# Patient Record
Sex: MALE | Race: OTHER | Employment: FULL TIME | URBAN - METROPOLITAN AREA
[De-identification: names, ages, dates, MRNs, and addresses within clinical notes are randomized per-mention and may not be internally consistent; named-entity substitution may affect disease eponyms.]

---

## 2018-11-01 ENCOUNTER — HOSPITAL ENCOUNTER (OUTPATIENT)
Dept: LAB | Age: 18
Discharge: HOME | End: 2018-11-01

## 2018-11-01 ENCOUNTER — HOSPITAL ENCOUNTER (OUTPATIENT)
Dept: GENERAL RADIOLOGY | Age: 18
Discharge: HOME | End: 2018-11-01
Payer: SELF-PAY

## 2018-11-01 ENCOUNTER — OFFICE VISIT (OUTPATIENT)
Dept: FAMILY MEDICINE CLINIC | Age: 18
End: 2018-11-01

## 2018-11-01 VITALS
HEIGHT: 64 IN | SYSTOLIC BLOOD PRESSURE: 125 MMHG | HEART RATE: 65 BPM | DIASTOLIC BLOOD PRESSURE: 76 MMHG | TEMPERATURE: 98.6 F | WEIGHT: 112 LBS | BODY MASS INDEX: 19.12 KG/M2

## 2018-11-01 DIAGNOSIS — M54.50 CHRONIC BILATERAL LOW BACK PAIN WITHOUT SCIATICA: Primary | ICD-10-CM

## 2018-11-01 DIAGNOSIS — G89.29 CHRONIC BILATERAL LOW BACK PAIN WITHOUT SCIATICA: Primary | ICD-10-CM

## 2018-11-01 DIAGNOSIS — G89.29 CHRONIC BILATERAL LOW BACK PAIN WITHOUT SCIATICA: ICD-10-CM

## 2018-11-01 DIAGNOSIS — R10.32 LEFT LOWER QUADRANT PAIN: ICD-10-CM

## 2018-11-01 DIAGNOSIS — B35.9 TINEA: ICD-10-CM

## 2018-11-01 DIAGNOSIS — M54.50 CHRONIC BILATERAL LOW BACK PAIN WITHOUT SCIATICA: ICD-10-CM

## 2018-11-01 LAB
ALBUMIN SERPL-MCNC: 4.2 G/DL (ref 3.5–5)
ALBUMIN/GLOB SERPL: 1.3 {RATIO} (ref 1.1–2.2)
ALP SERPL-CCNC: 84 U/L (ref 45–117)
ALT SERPL-CCNC: 36 U/L (ref 12–78)
ANION GAP SERPL CALC-SCNC: 11 MMOL/L (ref 5–15)
AST SERPL-CCNC: 18 U/L (ref 15–37)
BASOPHILS # BLD: 0.1 K/UL (ref 0–0.1)
BASOPHILS NFR BLD: 1 % (ref 0–1)
BILIRUB SERPL-MCNC: 0.5 MG/DL (ref 0.2–1)
BUN SERPL-MCNC: 17 MG/DL (ref 6–20)
BUN/CREAT SERPL: 21 (ref 12–20)
CALCIUM SERPL-MCNC: 8.9 MG/DL (ref 8.5–10.1)
CHLORIDE SERPL-SCNC: 105 MMOL/L (ref 97–108)
CHOLEST SERPL-MCNC: 110 MG/DL
CO2 SERPL-SCNC: 25 MMOL/L (ref 21–32)
CREAT SERPL-MCNC: 0.82 MG/DL (ref 0.7–1.3)
DIFFERENTIAL METHOD BLD: NORMAL
EOSINOPHIL # BLD: 0.2 K/UL (ref 0–0.4)
EOSINOPHIL NFR BLD: 3 % (ref 0–7)
ERYTHROCYTE [DISTWIDTH] IN BLOOD BY AUTOMATED COUNT: 12.8 % (ref 11.5–14.5)
GLOBULIN SER CALC-MCNC: 3.2 G/DL (ref 2–4)
GLUCOSE SERPL-MCNC: 122 MG/DL (ref 65–100)
HCT VFR BLD AUTO: 46.1 % (ref 36.6–50.3)
HDLC SERPL-MCNC: 39 MG/DL
HDLC SERPL: 2.8 {RATIO} (ref 0–5)
HGB BLD-MCNC: 15.4 G/DL (ref 12.1–17)
IMM GRANULOCYTES # BLD: 0 K/UL (ref 0–0.04)
IMM GRANULOCYTES NFR BLD AUTO: 0 % (ref 0–0.5)
LDLC SERPL CALC-MCNC: 56 MG/DL (ref 0–100)
LIPID PROFILE,FLP: NORMAL
LYMPHOCYTES # BLD: 1.7 K/UL (ref 0.8–3.5)
LYMPHOCYTES NFR BLD: 22 % (ref 12–49)
MCH RBC QN AUTO: 29.4 PG (ref 26–34)
MCHC RBC AUTO-ENTMCNC: 33.4 G/DL (ref 30–36.5)
MCV RBC AUTO: 88.1 FL (ref 80–99)
MONOCYTES # BLD: 0.3 K/UL (ref 0–1)
MONOCYTES NFR BLD: 5 % (ref 5–13)
NEUTS SEG # BLD: 5.2 K/UL (ref 1.8–8)
NEUTS SEG NFR BLD: 69 % (ref 32–75)
NRBC # BLD: 0 K/UL (ref 0–0.01)
NRBC BLD-RTO: 0 PER 100 WBC
PLATELET # BLD AUTO: 280 K/UL (ref 150–400)
PMV BLD AUTO: 10.4 FL (ref 8.9–12.9)
POTASSIUM SERPL-SCNC: 4.3 MMOL/L (ref 3.5–5.1)
PROT SERPL-MCNC: 7.4 G/DL (ref 6.4–8.2)
RBC # BLD AUTO: 5.23 M/UL (ref 4.1–5.7)
SODIUM SERPL-SCNC: 141 MMOL/L (ref 136–145)
TRIGL SERPL-MCNC: 75 MG/DL (ref ?–150)
TSH SERPL DL<=0.05 MIU/L-ACNC: 1.02 UIU/ML (ref 0.36–3.74)
VLDLC SERPL CALC-MCNC: 15 MG/DL
WBC # BLD AUTO: 7.5 K/UL (ref 4.1–11.1)

## 2018-11-01 PROCEDURE — 80061 LIPID PANEL: CPT | Performed by: FAMILY MEDICINE

## 2018-11-01 PROCEDURE — 85025 COMPLETE CBC W/AUTO DIFF WBC: CPT | Performed by: FAMILY MEDICINE

## 2018-11-01 PROCEDURE — 84443 ASSAY THYROID STIM HORMONE: CPT | Performed by: FAMILY MEDICINE

## 2018-11-01 PROCEDURE — 80053 COMPREHEN METABOLIC PANEL: CPT | Performed by: FAMILY MEDICINE

## 2018-11-01 PROCEDURE — 72100 X-RAY EXAM L-S SPINE 2/3 VWS: CPT

## 2018-11-01 RX ORDER — KETOCONAZOLE 20 MG/G
CREAM TOPICAL DAILY
Qty: 30 G | Refills: 0 | Status: SHIPPED | OUTPATIENT
Start: 2018-11-01 | End: 2019-02-21

## 2018-11-01 RX ORDER — DICLOFENAC SODIUM 75 MG/1
75 TABLET, DELAYED RELEASE ORAL 2 TIMES DAILY
Qty: 40 TAB | Refills: 5 | Status: SHIPPED | OUTPATIENT
Start: 2018-11-01 | End: 2019-11-21

## 2018-11-01 NOTE — PATIENT INSTRUCTIONS
Dolor en la parte baja de la espalda (lumbalgia): Ejercicios - [ Low Back Pain: Exercises ] Instrucciones de cuidado Aquí se presentan algunos ejemplos de ejercicios típicos de rehabilitación para tratar aguilar afección. Empiece cada ejercicio lentamente. Reduzca la intensidad del ejercicio si Brittany Brandi a tener dolor. Aguilar médico o fisioterapeuta le dirán cuándo puede comenzar con estos ejercicios y cuáles funcionarán mejor para usted. Cómo hacer los ejercicios [place] 1. Acuéstese boca abajo, apoyando aguilar cuerpo con los antebrazos. 2. Donn presión con los codos en el piso para elevar la espalda. Al hacer esto, relaje los músculos del estómago y permita que aguilar espalda se arquee sin usar los músculos de la espalda. Al hacer presión, evite que las caderas o la pelvis se separen del piso. 3. Mantenga la posición de 15 a 30 segundos y luego relájese. 4. Repita de 2 a 4 veces. Ejercicio de alternar Jayant Phy y pierna (trudy de caza) 1. Comience con las angle y las rodillas en el piso. 2. Contraiga los músculos del abdomen. 3. Eleve elia pierna del suelo y manténgala recta detrás de usted. Tenga cuidado de no dejar caer la cadera hacia abajo, porque eso hará que se le tuerza el tronco. 
4. Mantenga la posición lorena unos 6 segundos y luego baje la pierna y virgin isl a la otra pierna. 5. Repita de 8 a 12 veces con cada pierna. 6. Con el tiempo, vaya aumentando [employer] de 10 a 30 segundos cada vez. 
7. Si se siente estable y seguro con la pierna elevada, intente levantar el brazo opuesto directamente enfrente de usted al [employer]. Ejercicio de rodillas al [place] 1. Acuéstese boca arriba con las rodillas flexionadas y los pies apoyados sobre el piso. 2. Lleve elia rodilla hacia el pecho, manteniendo el otro pie apoyado en el suelo (o dejando la otra pierna recta, lilly lo sienta mejor en la parte baja de la espalda). 3. Mantenga la parte baja de la espalda presionada contra el suelo. Sosténgalo por lo menos de 15 a 30 segundos. 4. Relájese y regrese la rodilla a la posición inicial. 
5. Repita el ejercicio con la otra pierna. Repita de 2 a 4 veces con cada pierna. 6. Para lograr mayor estiramiento, deje la otra pierna apoyada en el suelo mientras se aguilar la rodilla [place]. Abdominales 1. Acuéstese en el suelo boca arriba, con las rodillas dobladas en un ángulo de 90 grados. Los pies deben estar apoyados en el suelo, a unas 12 pulgadas (30 cm) de las nalgas (glúteos). 2. Cruce los [place]. Si esto le causa molestias en el sid, pruebe a poner las angle detrás del sid (no de la april), con los codos abiertos. 3. Contraiga lentamente los músculos del abdomen y eleve los omóplatos del suelo. 4. Mantenga la april alineada con el cuerpo y no presione la barbilla hacia el pecho. 5. Sostenga esta posición por 1 o 2 segundos y después baje lentamente de nuevo hacia el suelo. 6. Repita de 8 a 12 veces. Ejercicio de inclinación de pelvis 1. Acuéstese de espalda con las rodillas flexionadas. 2. \"Tense\" aguilar estómago. [place] significa apretar los músculos contrayendo el ombligo e imaginando que se mueve hacia la columna vertebral. Deberá sentir lilly si la espalda estuviera ejerciendo presión sobre el suelo y que las caderas y la pelvis se balancean hacia atrás. 3. Mantenga la posición lorena aproximadamente 6 segundos mientras respira suavemente. 4. Repita de 8 a 12 veces. [place] con Benay Polite 1. Acuéstese boca arriba con ambas rodillas flexionadas y los tobillos doblados de manera que solo los talones estén sobre el piso. Las rodillas deben estar dobladas más o menos a 90 grados. 2. A continuación kelton presión con los talones en el suelo, apriete las nalgas (glúteos) y levante las caderas del suelo hasta que los hombros, las caderas y las rodillas estén en línea recta.  
3. Mantenga la posición lorena unos 6 segundos mientras sigue respirando normalmente, y luego baje lentamente las caderas hacia el piso y descanse por hasta 10 segundos. [address] 8 a 12 repeticiones. Estiramiento de isquiotibiales en el keyla de [facility] ([facility]) 1. Acuéstese boca arriba en el keyla [facility] ([facility]), con elia pierna a través de la [place]. 2. Deslice la pierna hacia arriba por la pared, para enderezar la rodilla. Debe sentir un leve estiramiento en la parte posterior de la pierna. 3. Sostenga el estiramiento por lo menos de 15 a 30 segundos. No arquee la espalda, estire los dedos de los pies ni flexione las rodillas. Mantenga un talón tocando el suelo y el otro tocando la pared. 4. Repita con la otra pierna. 5. Repita de 2 a 4 veces con cada pierna. Estiramiento de los músculos flexores de la cadera 1. Póngase de rodillas en el suelo con elai Tenny Sida y [place] atrás. Coloque la rodilla de adelante encima del pie. Mantenga la otra rodilla en contacto con el suelo. 2. Empuje lentamente la cadera hacia adelante hasta que sienta un estiramiento en la parte superior del muslo de la pierna que está atrás. 3. Sostenga el estiramiento por lo menos de 15 a 30 segundos. Repita con la otra pierna. 4. Repita de 2 a 4 veces a cada lado. Sentadillas de pared 1. Párese con la espalda a entre 10 y 12 pulgadas (25 a 30 cm) de distancia de la pared. 2. Inclínese hacia la pared [employer] la espalda quede plana sobre marisel. 3. Deslícese lentamente hacia abajo hasta que las rodillas estén ligeramente flexionadas, presionando la parte baja de la espalda contra la pared. 4. Mantenga la posición lorena unos 6 segundos y después deslícese hacia arriba por la pared. 5. Repita de 8 a 12 veces. La atención de seguimiento es elia parte clave de aguilar tratamiento y seguridad. Asegúrese de hacer y acudir a todas las citas, y llame a aguilar médico si está teniendo problemas.  También es elia buena idea [employer] resultados de jose luis exámenes y mantener elia lista de los medicamentos que yasir. Dónde puede encontrar más información en inglés? Elise Hernandez a http://brandie-madiha.info/. [place] M396 en la búsqueda para aprender más acerca de \"Dolor en la parte baja de la espalda (lumbalgia): Ejercicios - [ Low Back Pain: Exercises ]. \" 
Revisado: 29 noviembre, 2017 Versión del contenido: 11.8 © 3396-4357 Healthwise, Incorporated. Las instrucciones de cuidado fueron adaptadas bajo licencia por [employer] (which disclaims liability or warranty for this information). Si usted tiene Yadkin Jamul afección médica o sobre estas instrucciones, siempre pregunte a aguilar profesional de cielo. Healthwise, Incorporated niega toda garantía o responsabilidad por aguilar uso de esta información.

## 2018-11-01 NOTE — PROGRESS NOTES
AVS printed and reviewed. E scripts discussed. Good Rx done for topical cream at [facility] cost $21.20. Discussed process for getting xray and billing issues w/ hospital tests. Encouraged to apply for financial assistance w/ hospital bills. Pt going now for xray to [facility] outpt registration and anxious for results. F/u TBD. Discussion assisted by CAV , Guerda Licea.

## 2018-11-01 NOTE — PROGRESS NOTES
HISTORY OF PRESENT ILLNESS Fabiola Rivera is a 19 y.o. male. HPI Patient has a pulled muscle in the left side of the lower abdomen, as well as in the lumbar spine. Has a spot in the face. Also when he runs he has a rash. States that 2 years ago he lifted a granite that was very heavy. Review of Systems Respiratory: Negative for cough, shortness of breath and wheezing. Cardiovascular: Negative for chest pain, palpitations and leg swelling. Gastrointestinal: Positive for abdominal pain. Genitourinary: Negative for dysuria, frequency and urgency. Musculoskeletal: Positive for back pain and myalgias. Skin: Positive for rash. /76 (BP 1 Location: Right arm, BP Patient Position: Sitting)   Pulse 65   Temp 98.6 °F (37 °C) (Oral)   Ht 5' 3.78\" (1.62 m)   Wt 112 lb (50.8 kg)   BMI 19.36 kg/m² Physical Exam  
Constitutional: He is oriented to person, place, and time. He appears well-developed and well-nourished. HENT:  
Head: Normocephalic. Right Ear: External ear normal.  
Left Ear: External ear normal.  
Eyes: Conjunctivae and EOM are normal. Pupils are equal, round, and reactive to light. Neck: Normal range of motion. Neck supple. No thyromegaly present. Cardiovascular: Normal rate, regular rhythm and normal heart sounds. No murmur heard. Pulmonary/Chest: Effort normal and breath sounds normal. No respiratory distress. He has no wheezes. Abdominal: Soft. Bowel sounds are normal. He exhibits no distension. There is tenderness. LLQ pain to palpation Musculoskeletal: Normal range of motion. He exhibits tenderness. He exhibits no edema.  
+ lumbar pain, SLT negative Neurological: He is alert and oriented to person, place, and time. He has normal reflexes. No cranial nerve deficit. Skin: Skin is warm. Rash noted. No erythema. Hypopigmented skin lesions on the left side of nose and right side of face ASSESSMENT and PLAN 
 Diagnoses and all orders for this visit: 
 
1. Chronic bilateral low back pain without sciatica 
-     diclofenac EC (VOLTAREN) 75 mg EC tablet; Take 1 Tab by mouth two (2) times a day. As needed -     XR SPINE LUMB 2 OR 3 V; Future 2. Left lower quadrant pain -     LIPID PANEL; Future -     METABOLIC PANEL, COMPREHENSIVE; Future -     CBC WITH AUTOMATED DIFF; Future 
-     TSH 3RD GENERATION; Future 3. Tinea 
-     ketoconazole (NIZORAL) 2 % topical cream; Apply  to affected area daily. Chronic back pain,  We will order a lumbar x ray to evaluate. Back exercise discussed with patient. Lesions of the face are consistent with tinea,  Prescription for ketoconazole sent to the pharmacy

## 2018-11-01 NOTE — PROGRESS NOTES
Coordination of Care 1. Have you been to the ER, urgent care clinic since your last visit? Hospitalized since your last visit? No 
 
2. Have you seen or consulted any other health care providers outside of the [address] [place] [address] since your last visit? Include any pap smears or colon screening. No 
 
Does the patient need refills? N/A Learning Assessment Complete? yes Depression Screening complete in the past 12 months? yes

## 2018-11-01 NOTE — PROGRESS NOTES
The intervertebral disc between vertebrae L5 and S1 is decreased in height. This is likely the area he injured 2 years ago.   Needs to do the back exercises discussed during the visit, this will help his symptoms

## 2018-11-05 NOTE — PROGRESS NOTES
Normal thyroid function, normal cholesterol levels, normal kidney function, liver function test, electrolytes. Sugar levels were elevated at 122.   He should be advise to watch his diet, avoid carbohydrates, bread, pasta, rice, tortillas, and will need to recheck levels in about 3 months

## 2018-11-15 NOTE — PROGRESS NOTES
Result note from Sofya Weber given to patient over the phone. This has been fully explained to the patient, who indicates understanding. No further questions from patient.

## 2018-11-20 NOTE — PROGRESS NOTES
I called pt today with [employer]  # [id number] and gave message from provider.  Darryl Mack RN

## 2018-11-20 NOTE — PROGRESS NOTES
Pt asked to meet with nurse to talk further about his labs and how to manage his diet. I made him a nurse visit appointment for 12/3/18 at 8:30.   Christofer Hancock RN

## 2018-12-03 ENCOUNTER — CLINICAL SUPPORT (OUTPATIENT)
Dept: FAMILY MEDICINE CLINIC | Age: 18
End: 2018-12-03

## 2018-12-03 DIAGNOSIS — Z71.9 COUNSELED BY NURSE: Primary | ICD-10-CM

## 2018-12-03 NOTE — PROGRESS NOTES
Result note from Dr. Stark Parents on labs from 11/01/18 given to patient verbally. Patient aware to RTC make the line for follow up appt. With the provider to recheck blood test in about 3 months. Information about healthy diet and healthy eating ; dietary guidelines from clinical references was given to the patient in [facility]. This has been fully explained to the patient, who indicates understanding. No further questions from patient.

## 2018-12-03 NOTE — Clinical Note
Instructed patient to RTC in 3 months as a walk in for provider appt.  To recheck blood test (slightly elevated glucose on previous CMP)

## 2019-02-21 ENCOUNTER — OFFICE VISIT (OUTPATIENT)
Dept: FAMILY MEDICINE CLINIC | Age: 19
End: 2019-02-21

## 2019-02-21 ENCOUNTER — HOSPITAL ENCOUNTER (OUTPATIENT)
Dept: LAB | Age: 19
Discharge: HOME | End: 2019-02-21

## 2019-02-21 VITALS
TEMPERATURE: 98.1 F | SYSTOLIC BLOOD PRESSURE: 124 MMHG | WEIGHT: 111 LBS | DIASTOLIC BLOOD PRESSURE: 83 MMHG | HEART RATE: 74 BPM | BODY MASS INDEX: 19.18 KG/M2

## 2019-02-21 DIAGNOSIS — Z71.1 CONCERN ABOUT SEXUAL DYSFUNCTION IN MALE WITHOUT DIAGNOSIS: ICD-10-CM

## 2019-02-21 DIAGNOSIS — Z71.1 CONCERN ABOUT SEXUAL DYSFUNCTION IN MALE WITHOUT DIAGNOSIS: Primary | ICD-10-CM

## 2019-02-21 DIAGNOSIS — R21 RASH/SKIN ERUPTION: ICD-10-CM

## 2019-02-21 DIAGNOSIS — R30.0 DYSURIA: ICD-10-CM

## 2019-02-21 LAB
ANION GAP SERPL CALC-SCNC: 7 MMOL/L (ref 5–15)
APPEARANCE UR: CLEAR
BASOPHILS # BLD: 0.1 K/UL (ref 0–0.1)
BASOPHILS NFR BLD: 1 % (ref 0–1)
BILIRUB UR QL: NEGATIVE
BUN SERPL-MCNC: 23 MG/DL (ref 6–20)
BUN/CREAT SERPL: 25 (ref 12–20)
CALCIUM SERPL-MCNC: 8.9 MG/DL (ref 8.5–10.1)
CHLORIDE SERPL-SCNC: 107 MMOL/L (ref 97–108)
CO2 SERPL-SCNC: 27 MMOL/L (ref 21–32)
COLOR UR: NORMAL
CREAT SERPL-MCNC: 0.91 MG/DL (ref 0.7–1.3)
DIFFERENTIAL METHOD BLD: ABNORMAL
EOSINOPHIL # BLD: 0.1 K/UL (ref 0–0.4)
EOSINOPHIL NFR BLD: 1 % (ref 0–7)
ERYTHROCYTE [DISTWIDTH] IN BLOOD BY AUTOMATED COUNT: 12.3 % (ref 11.5–14.5)
GLUCOSE SERPL-MCNC: 101 MG/DL (ref 65–100)
GLUCOSE UR STRIP.AUTO-MCNC: NEGATIVE MG/DL
HCT VFR BLD AUTO: 49.9 % (ref 36.6–50.3)
HGB BLD-MCNC: 16.5 G/DL (ref 12.1–17)
HGB UR QL STRIP: NEGATIVE
IMM GRANULOCYTES # BLD AUTO: 0 K/UL (ref 0–0.04)
IMM GRANULOCYTES NFR BLD AUTO: 0 % (ref 0–0.5)
KETONES UR QL STRIP.AUTO: NEGATIVE MG/DL
LEUKOCYTE ESTERASE UR QL STRIP.AUTO: NEGATIVE
LYMPHOCYTES # BLD: 1.1 K/UL (ref 0.8–3.5)
LYMPHOCYTES NFR BLD: 15 % (ref 12–49)
MCH RBC QN AUTO: 29.9 PG (ref 26–34)
MCHC RBC AUTO-ENTMCNC: 33.1 G/DL (ref 30–36.5)
MCV RBC AUTO: 90.4 FL (ref 80–99)
MONOCYTES # BLD: 0.3 K/UL (ref 0–1)
MONOCYTES NFR BLD: 4 % (ref 5–13)
NEUTS SEG # BLD: 6.1 K/UL (ref 1.8–8)
NEUTS SEG NFR BLD: 79 % (ref 32–75)
NITRITE UR QL STRIP.AUTO: NEGATIVE
NRBC # BLD: 0 K/UL (ref 0–0.01)
NRBC BLD-RTO: 0 PER 100 WBC
PH UR STRIP: 5 [PH] (ref 5–8)
PLATELET # BLD AUTO: 263 K/UL (ref 150–400)
PMV BLD AUTO: 10.4 FL (ref 8.9–12.9)
POTASSIUM SERPL-SCNC: 4.2 MMOL/L (ref 3.5–5.1)
PROT UR STRIP-MCNC: NEGATIVE MG/DL
RBC # BLD AUTO: 5.52 M/UL (ref 4.1–5.7)
SODIUM SERPL-SCNC: 141 MMOL/L (ref 136–145)
SP GR UR REFRACTOMETRY: 1.02 (ref 1–1.03)
UROBILINOGEN UR QL STRIP.AUTO: 0.2 EU/DL (ref 0.2–1)
WBC # BLD AUTO: 7.7 K/UL (ref 4.1–11.1)

## 2019-02-21 PROCEDURE — 87086 URINE CULTURE/COLONY COUNT: CPT

## 2019-02-21 PROCEDURE — 85025 COMPLETE CBC W/AUTO DIFF WBC: CPT

## 2019-02-21 PROCEDURE — 81003 URINALYSIS AUTO W/O SCOPE: CPT

## 2019-02-21 PROCEDURE — 87389 HIV-1 AG W/HIV-1&-2 AB AG IA: CPT

## 2019-02-21 PROCEDURE — 87077 CULTURE AEROBIC IDENTIFY: CPT

## 2019-02-21 PROCEDURE — 80048 BASIC METABOLIC PNL TOTAL CA: CPT

## 2019-02-21 PROCEDURE — 86803 HEPATITIS C AB TEST: CPT

## 2019-02-21 PROCEDURE — 87186 SC STD MICRODIL/AGAR DIL: CPT

## 2019-02-21 PROCEDURE — 87491 CHLMYD TRACH DNA AMP PROBE: CPT

## 2019-02-21 PROCEDURE — 86592 SYPHILIS TEST NON-TREP QUAL: CPT

## 2019-02-21 RX ORDER — CHLORPHENIRAMINE MALEATE 4 MG
TABLET ORAL 2 TIMES DAILY
Qty: 30 G | Refills: 1 | Status: SHIPPED | OUTPATIENT
Start: 2019-02-21

## 2019-02-21 RX ORDER — KETOCONAZOLE 20 MG/ML
SHAMPOO TOPICAL
Qty: 120 ML | Refills: 0 | Status: SHIPPED | OUTPATIENT
Start: 2019-02-21

## 2019-02-21 NOTE — PROGRESS NOTES
Patient seen for discharge with assistance from [employer]  #[id number]. We reviewed the AVS instructions, prescription, pharmacy location, printed Good Rx coupon, and the recommendation for OTC Lotrimin cream. The patient will go now to registration to schedule a 2 week NV for lab results.  Aminta Diaz RN

## 2019-02-21 NOTE — PROGRESS NOTES
Miller Lazcano is a 19 y.o. male    Issues discussed today include:    Chief Complaint   Patient presents with    Back Pain     follow up for x-ray results    Skin Problem     white spots on face       1) Low back pain:  Remains, intermittent. Had renal stones in past, dx'd at [facility]. + intermittent dysuria. Denies hematuria, flank pain, fever, chills, nausea. Wants to discuss xray and lab results. Does mention penile lesions and concern for STIs. Last intercourse 1 yr ago. 2) Skin problem:  White spots on sides of nose, left sided neck and chest wall. Can be dry and itchy. Has been using moisturizers with minimal relief. Data reviewed or ordered today:       Other problems include: There is no problem list on file for this patient. Medications:  Current Outpatient Medications   Medication Sig Dispense Refill    clotrimazole (LOTRIMIN) 1 % topical cream Apply  to affected area two (2) times a day. Skin lesions on face, chest. Aplique dos veces al reilly a manchas janett en balbir andersonho 30 g 1    ketoconazole (NIZORAL) 2 % shampoo Apply 5 to 10 mL to wet scalp, lather, leave on 3 to 5 minutes, and rinse; apply twice weekly for 2 to 4 weeks. 120 mL 0    diclofenac EC (VOLTAREN) 75 mg EC tablet Take 1 Tab by mouth two (2) times a day. As needed 40 Tab 5       Allergies:  No Known Allergies    LMP:  No LMP for male patient.     Social History     Socioeconomic History    Marital status: SINGLE     Spouse name: Not on file    Number of children: Not on file    Years of education: Not on file    Highest education level: Not on file   Social Needs    Financial resource strain: Not on file    Food insecurity - worry: Not on file    Food insecurity - inability: Not on file    Transportation needs - medical: Not on file   RuiYi needs - non-medical: Not on file   Occupational History    Not on file   Tobacco Use    Smoking status: Never Smoker    Smokeless tobacco: Never Used Substance and Sexual Activity    Alcohol use: No     Alcohol/week: 0.0 oz    Drug use: No    Sexual activity: Not on file   Other Topics Concern    Not on file   Social History Narrative    Not on file       No family history on file. Physical Exam   Visit Vitals  /83 (BP 1 Location: Right arm, BP Patient Position: Sitting)   Pulse 74   Temp 98.1 °F (36.7 °C) (Oral)   Wt 111 lb (50.3 kg)   BMI 19.18 kg/m²      BP Readings from Last 3 Encounters:   02/21/19 124/83   11/01/18 125/76   09/15/16 128/85 (88 %, Z = 1.15 /  98 %, Z = 1.98)*     *BP percentiles are based on the August 2017 AAP Clinical Practice Guideline for boys     Constitutional: Appears well,  No acute distress, Vitals noted  Psychiatric:  Affect normal, Alert and Oriented to person/place/time  Eyes:  Conjunctiva clear, no drainage  ENT:  External ears and nose normal, Mucous membranes moist  Neck:  General inspection normal. Supple. Heart:  Normal HR, Normal S1 and S2,  Regular rhythm. No murmurs, rubs or gallops. Lungs:  Clear to auscultation, good respiratory effort, no wheezes, rales or rhonchi  : No testicular masses, edema. 1-2mm papule on left penile shaft. No other penile or scrotal skin lesions. No tenderness or induration of the epididymis bilaterally. MSK/Back: No CVAT. No spinous process tenderness or step offs  Extremities: Without edema, good peripheral pulses  Skin:  Warm to palpation, 1-2 cm hypopigmented macules/patches on bilateral nasal bridge, left lateral neck, left anterior chest wall, no scale or erythema, no skin breakdown or ulceration      Assessment/Plan:      ICD-10-CM ICD-9-CM    1. Concern about sexual dysfunction in male without diagnosis Z71.1 V65.5 CHLAMYDIA/GC PCR      HEPATITIS C AB      HIV 1/2 AG/AB, 4TH GENERATION,W RFLX CONFIRM      RPR   2. Rash/skin eruption R21 782.1 CBC WITH AUTOMATED DIFF      METABOLIC PANEL, BASIC   3. Dysuria R30.0 788. 1 URINALYSIS W/ RFLX MICROSCOPIC      CULTURE, URINE       Skin rash, suspect tinea or possible seborrheic dermatitis of the scalp, face/neck. - Clotrimazole cream 1% bid   - Ketoconazole shampoo with instruction on rx  - AVS with info on tinea     Intermittent dysuria and h/o nephrolithiasis, no severe pain or s/s of pyelo. No CVAT today. - UA with reflex micro, UCx  - STI labs per pt request  - Needs 2 week nurse visit to review labs including STI labs    We will communicate if anything abnl with urine or serum labs prior to nurse visit if tx or more w/u needed      Follow-up Disposition:  Return if symptoms worsen or fail to improve, for 2 weeks for nurse visit for lab review.         Shannan Louis MD  [address] [place] [address]

## 2019-02-21 NOTE — PROGRESS NOTES
Coordination of Care  1. Have you been to the ER, urgent care clinic since your last visit? Hospitalized since your last visit? Yes Reason for visit: x-ray for back pain. sent by Sonya Sullivan dr.     2. Have you seen or consulted any other health care providers outside of the [address] [place] [address] since your last visit? Include any pap smears or colon screening. No    Does the patient need refills? NO    Learning Assessment Complete?  yes

## 2019-02-21 NOTE — PATIENT INSTRUCTIONS
Ketoconazole shampoo instructions: Apply 5 to 10 mL to wet scalp, lather, leave on 3 to 5 minutes, and rinse; apply twice weekly for 2 to 4 weeks. Aplique de 5 a 10 ml en el cuero cabelludo húmedo, kelton espuma, deje actuar de 3 a 5 minutos y enjuague; Aplicar dos veces por semana lorena 2 a 4 semanas. Dolor al Illene Gone (disuria): Instrucciones de cuidado - [ Painful Urination (Dysuria): Care Instructions ]  Instrucciones de cuidado  Sentir ardor al orinar (disuria) es un síntoma común de elia infección de las vías urinarias u otros problemas urinarios. La vejiga puede inflamarse. Esmond puede causar dolor al llenarse o vaciarse la vejiga. Usted también puede sentir dolor si el conducto que transporta la orina desde la vejiga hacia afuera del organismo (uretra) se irrita o se infecta. Las infecciones de transmisión sexual (STI, por jose luis siglas en inglés) también pueden causar dolor al orinar. A veces, el dolor puede ser causado por otras cosas además de elia infección. La uretra puede irritarse a causa de jabones, perfumes u objetos extraños en la uretra. Los cálculos renales pueden causar dolor cuando pasan por la uretra. Puede ser difícil encontrar la causa. Es posible que usted deba hacerse pruebas. El tratamiento para el dolor al orinar depende de la causa. La atención de seguimiento es elia parte clave de aguilar tratamiento y seguridad. Asegúrese de hacer y acudir a todas las citas, y llame a aguilar médico si está teniendo problemas. También es elia buena idea saber los resultados de jose luis exámenes y mantener elia lista de los medicamentos que yasir. ¿Cómo puede cuidarse en el hogar? · [employer] agua lorena los siguientes ashley o [address] [place]ond ayudará a que la orina sea menos concentrada. (Si tiene enfermedad de los riñones, el corazón o el hígado y tiene que Blanca's líquidos, hable con aguilar médico antes de aumentar la cantidad de líquidos que tutu). · Evite las bebidas gaseosas o con cafeína.  Pueden irritar la vejiga. · Orine con frecuencia. Trate de vaciar la vejiga cada vez que orine. Para las mujeres:  · Orine inmediatamente después de christi tenido relaciones sexuales. · Después de ir al baño, límpiese de adelante hacia atrás. · Evite el uso de duchas vaginales, los ut de burbujas y los [address] de higiene femenina. Y evite otros productos para la higiene femenina que contengan desodorantes. ¿Cuándo debe pedir ayuda? Llame a aguilar médico ahora mismo o busque atención médica inmediata si:    · Tiene síntomas nuevos lilly fiebre, náuseas o vómito.     · Tiene síntomas nuevos o peores de un problema urinario. Por ejemplo:  ? Tiene edie o pus en la orina. ? Tiene escalofríos o le duele el cuerpo. ? Siente más dolor al [place] ? Tiene dolor en la portillo o el abdomen. ? Tiene dolor de espalda ming debajo de la caja torácica (el flanco).    Vigile muy de cerca los cambios en aguilar cielo, y asegúrese de comunicarse con aguilar médico si tiene algún problema. ¿Dónde puede encontrar más información en inglés? Gail Ybarra a http://brandie-madiha.info/. [facility] [id number] en la búsqueda para aprender más acerca de \"Dolor al [place] (disuria): Instrucciones de cuidado - [ Painful Urination (Dysuria): Care Instructions ]. \"  Revisado: 20 marzo, 2018  Versión del contenido: 11.9  © 9820-4841 [employer]. Las instrucciones de cuidado fueron adaptadas bajo licencia por [employer] (which disclaims liability or warranty for this information). Si usted tiene [place] afección médica o sobre estas instrucciones, siempre pregunte a aguilar profesional de cielo. [employer] niega toda garantía o responsabilidad por aguilar uso de esta información. Tiña: Instrucciones de cuidado - [ Ringworm: Care Instructions ]  Instrucciones de cuidado  La tiña es elia infección de la piel causada por un hongo. Provoca un salpullido redondeado, con escamas, que podría agrietarse y producir comezón. El salpullido puede extenderse por elia amplia audie. Un tipo de hongo que causa tiña suele encontrarse en los vestuarios y las piscinas ([place]). Crece en zonas tibias y húmedas de la piel, lilly en los pliegues. La tiña puede contagiarse compartiendo toallas, ropa o equipo deportivo. También puede contagiarse si se toca a alguien que tiene [facility]. La tiña se trata con elia crema que elimina el hongo. Si el salpullido se extiende, podría necesitar pastillas para eliminarlo. A menudo la tiña reaparece después del tratamiento. Si el salpullido se infecta con bacterias, podría necesitar antibióticos. La atención de seguimiento es elia parte clave de aguilar tratamiento y seguridad. Asegúrese de hacer y acudir a todas las citas, y llame a augilar médico si está teniendo problemas. También es elia buena idea saber los resultados de jose luis exámenes y mantener elia lista de los medicamentos que yasir. ¿Cómo puede cuidarse en el hogar? · [employer] medicamentos exactamente lilly le fueron recetados. Llame a aguilar médico si tiene algún problema con los medicamentos. · Lávese el salpullido con agua y Larena All y séquese raul. · Trate de aplicar elia crema de venta elsa con clotrimazol o miconazol sobre el salpullido. Los nombres de richardson incluyen Lotrimin, Micatin y [place]. La crema con terbinafina (Lamisil) también está disponible sin receta médica. Extienda la crema más allá del borde del salpullido. Siga las instrucciones del envase. No deje de usar el medicamento por el simple hecho de que la piel parezca christi sanado. Es probable que necesite seguir el tratamiento lorena 2 a 4 semanas. · Para evitar otra infección:  ? No camine descalzo en lugares públicos lilly gimnasios o vestuarios. Evite compartir toallas y ropa. Use chanclas u otro tipo de calzado en la ducha.  ? No use ropa ajustada ni deje la piel mojada lorena mucho tiempo, lilly cuando se sneha puesto un traje de baño mojado o ropas sudadas.   ¿Cuándo debe pedir ayuda? Llame a aguilar médico ahora mismo o busque atención médica inmediata si:    · Tiene señales de infección, tales lilly:  ? Dolor, hinchazón o aumento de la temperatura en la piel. ? Vetas rojizas cerca de elia herida en la piel. ? Pus que sale del salpullido. ? Lauren Cobia especial atención a los cambios en aguilar cielo y asegúrese de comunicarse con aguilar médico si:    · La tiña no mejora después de 2 semanas de tratamiento.     · Usted no mejora lilly se esperaba. ¿Dónde puede encontrar más información en inglés? [employer] a http://brandie-madiha.info/. Elinda Tilley B230 en la búsqueda para aprender más acerca de \"Tiña: Instrucciones de cuidado - [ Ringworm: Care Instructions ]. \"  Revisado: 17 jaqueline, 2018  Versión del contenido: 11.9  © 9469-9345 [employer], Incorporated. Las instrucciones de cuidado fueron adaptadas bajo licencia por [employer] (which disclaims liability or warranty for this information). Si usted tiene [place] afección médica o sobre estas instrucciones, siempre pregunte a aguilar profesional de cielo. [employer] niega toda garantía o responsabilidad por aguilar uso de esta información.

## 2019-02-22 LAB
C TRACH DNA SPEC QL NAA+PROBE: NEGATIVE
HCV AB SERPL QL IA: NONREACTIVE
HCV COMMENT,HCGAC: NORMAL
HIV 1+2 AB+HIV1 P24 AG SERPL QL IA: NONREACTIVE
HIV12 RESULT COMMENT, HHIVC: NORMAL
N GONORRHOEA DNA SPEC QL NAA+PROBE: NEGATIVE
RPR SER QL: NONREACTIVE
SAMPLE TYPE: NORMAL
SERVICE CMNT-IMP: NORMAL
SPECIMEN SOURCE: NORMAL

## 2019-02-25 RX ORDER — CEPHALEXIN 500 MG/1
500 CAPSULE ORAL 2 TIMES DAILY
Qty: 14 CAP | Refills: 0 | Status: SHIPPED | OUTPATIENT
Start: 2019-02-25 | End: 2019-02-27

## 2019-02-25 NOTE — PROGRESS NOTES
I called pt and had long conversation with pt re: results  UCx with low colony count (only 1K) of GNRs, no further speciation. Pt reports having daily dysuria, so I called lab to f/u and they still have specimen and will run speciation  CBC normal, no anemia or sign of infection   BMP normal, no kidney or electrolyte problems identified other than elevated BUN and BUN/cr ration indicating some possible dehydration  UA wnl without blood, so no concern for recurrent renal stones and no high SG to indicate concentrated urine  All STI testing negative, no evidence of infection:  RPR (syphilis) neg  Hep C ab neg  HIV neg  GC/Chlam neg    Pt says he only drink 3-4 cups of water daily. Continues to have flank pain, but no blood in urine. Denies fever, chills, nausea. He is also very worried about lesions in his mouth and asks what that could be. I explained we did not address this at this last visit and he'll need to f/u for evaluation. I reassured him that HIV test was neg bc he was very concerned about that. He again relayed last intercourse was with a \"woman on the road\" and he was not sure if she was sick. I told him if last encounter was > 6 mo ago, then we should evidence of infection on his HIV ab screening test.     I advised him to take abx I am sending for UTI and increase water intake to 8+ glasses of water daily. If he continued to have  or oral sxs in 1 week, then I encouraged him to RTC for evaluation. He asked about an appt, but I told him sadly we couldn't offer him one d/t schedule being full in the time frame for his needed f/u. All questions were answered.

## 2019-02-26 LAB
BACTERIA SPEC CULT: ABNORMAL
BACTERIA SPEC CULT: ABNORMAL
CC UR VC: ABNORMAL
SERVICE CMNT-IMP: ABNORMAL

## 2019-02-27 RX ORDER — CIPROFLOXACIN 500 MG/1
500 TABLET ORAL 2 TIMES DAILY
Qty: 14 TAB | Refills: 0 | Status: SHIPPED | OUTPATIENT
Start: 2019-02-27 | End: 2019-03-06

## 2019-02-27 NOTE — PROGRESS NOTES
UCx final result klebiella oxytoca that is resistna to first generation cephalosporins  I am changing abx from keflex to cipro 500mg bid x 7 days (should cost $15 at [facility] without coupon)  I called and informed pt of this change and need to stop first abx and start the new one tonight

## 2019-04-10 ENCOUNTER — TELEPHONE (OUTPATIENT)
Dept: FAMILY MEDICINE CLINIC | Age: 19
End: 2019-04-10

## 2019-04-10 NOTE — TELEPHONE ENCOUNTER
Patient call main office wanted to know his labs results . Patient would like to speak with a provider or a nurse .     Thank you     Att:Julianne Person

## 2019-04-11 NOTE — TELEPHONE ENCOUNTER
RN called pt with the assistance of [employer]  # [id number]. RN asked if pt had phone conversation with Dr. Jayy Andrade on 2/25/19 regarding his test results. He said he did remember this call, but he is concerned that he may still have an infection. He took the Cipro that Dr. Jayy Andrade ordered after the second urine test result came back, and he is not having any symptoms. He stated initially that he had a sexual encounter with a woman after he saw the Dr. Jayy Andrade on 2/21/19, but later clarified that he has not had any encounters since that office visit. RN reminded pt that the doctor insured him that if his last encounter was over 6 months prior to the testing, then the test results were valid and should not need to be repeated at this time. Rn advised pt to return to the [facility] if he would like to discuss repeat testing with the provider. Pt expressed understanding of the above information. Rebecca Purvis.

## 2019-04-25 ENCOUNTER — HOSPITAL ENCOUNTER (OUTPATIENT)
Dept: LAB | Age: 19
Discharge: HOME | End: 2019-04-25

## 2019-04-25 ENCOUNTER — OFFICE VISIT (OUTPATIENT)
Dept: FAMILY MEDICINE CLINIC | Age: 19
End: 2019-04-25

## 2019-04-25 VITALS
HEIGHT: 64 IN | DIASTOLIC BLOOD PRESSURE: 78 MMHG | HEART RATE: 63 BPM | BODY MASS INDEX: 18.95 KG/M2 | SYSTOLIC BLOOD PRESSURE: 125 MMHG | WEIGHT: 111 LBS | TEMPERATURE: 98.2 F

## 2019-04-25 DIAGNOSIS — R36.9 ABNORMAL PENILE DISCHARGE: ICD-10-CM

## 2019-04-25 DIAGNOSIS — R36.9 ABNORMAL PENILE DISCHARGE: Primary | ICD-10-CM

## 2019-04-25 PROCEDURE — 87491 CHLMYD TRACH DNA AMP PROBE: CPT

## 2019-04-25 PROCEDURE — 87389 HIV-1 AG W/HIV-1&-2 AB AG IA: CPT

## 2019-04-25 PROCEDURE — 86592 SYPHILIS TEST NON-TREP QUAL: CPT

## 2019-04-25 PROCEDURE — 86803 HEPATITIS C AB TEST: CPT

## 2019-04-25 PROCEDURE — 87086 URINE CULTURE/COLONY COUNT: CPT

## 2019-04-25 NOTE — PROGRESS NOTES
Coordination of Care  1. Have you been to the ER, urgent care clinic since your last visit? Hospitalized since your last visit? No    2. Have you seen or consulted any other health care providers outside of the [address] [place] [address] since your last visit? Include any pap smears or colon screening. No    Does the patient need refills? N/A    Learning Assessment Complete?  yes

## 2019-04-25 NOTE — PROGRESS NOTES
Cholo Rowland is a 22 y.o. male    Issues discussed today include:    Chief Complaint   Patient presents with    Penile Lesion       1) Penile lesion and urinary problem:  Describes \"mucus\" discharge from penis 2-3 times per day every day. Says it is transparent. Not green, yellow or bloody. + pain with urination sometimes. Says it comes without warning or trigger. Also with skin lesion on left side of penis that has him worried about STI. It has not changed, but feels painful at times. Has largely abstained from intercourse d/t not wanting to pass infection to his partner. Reports last high sexual partner was 18 mo ago  Reports there was a time in his teen years when he masturbate multiple times per day and worries that that has created these problem. Denies fever, chills, nausea, abd pain, constipation, diarrhea. + dysuria off and on. Wants repeat STI testing today.     Data reviewed or ordered today:       Other problems include: There is no problem list on file for this patient. Medications:  Current Outpatient Medications   Medication Sig Dispense Refill    clotrimazole (LOTRIMIN) 1 % topical cream Apply  to affected area two (2) times a day. Skin lesions on face, chest. Aplique dos veces al reilly a manchas janett en monica, pecho 30 g 1    ketoconazole (NIZORAL) 2 % shampoo Apply 5 to 10 mL to wet scalp, lather, leave on 3 to 5 minutes, and rinse; apply twice weekly for 2 to 4 weeks. 120 mL 0    diclofenac EC (VOLTAREN) 75 mg EC tablet Take 1 Tab by mouth two (2) times a day. As needed 40 Tab 5       Allergies:  No Known Allergies    LMP:  No LMP for male patient.     Social History     Socioeconomic History    Marital status: SINGLE     Spouse name: Not on file    Number of children: Not on file    Years of education: Not on file    Highest education level: Not on file   Occupational History    Not on file   Social Needs    Financial resource strain: Not on file    Food insecurity: Worry: Not on file     Inability: Not on file    Transportation needs:     Medical: Not on file     Non-medical: Not on file   Tobacco Use    Smoking status: Never Smoker    Smokeless tobacco: Never Used   Substance and Sexual Activity    Alcohol use: No     Alcohol/week: 0.0 oz    Drug use: No    Sexual activity: Not on file   Lifestyle    Physical activity:     Days per week: Not on file     Minutes per session: Not on file    Stress: Not on file   Relationships    Social connections:     Talks on phone: Not on file     Gets together: Not on file     Attends Pentecostal service: Not on file     Active member of club or organization: Not on file     Attends meetings of clubs or organizations: Not on file     Relationship status: Not on file    Intimate partner violence:     Fear of current or ex partner: Not on file     Emotionally abused: Not on file     Physically abused: Not on file     Forced sexual activity: Not on file   Other Topics Concern    Not on file   Social History Narrative    Not on file       No family history on file. Physical Exam   Visit Vitals  /78 (BP 1 Location: Right arm, BP Patient Position: Sitting)   Pulse 63   Temp 98.2 °F (36.8 °C) (Oral)   Ht 5' 3.98\" (1.625 m)   Wt 111 lb (50.3 kg)   BMI 19.07 kg/m²      BP Readings from Last 3 Encounters:   04/25/19 125/78   02/21/19 124/83   11/01/18 125/76     Constitutional: Appears well,  No acute distress, Vitals noted  Psychiatric:  Affect normal, Alert and Oriented to person/place/time  Eyes:  Conjunctiva clear, no drainage  ENT:  External ears and nose normal, Mucous membranes moist  Neck:  General inspection normal. Supple. Lungs:  No respiratory distress  : No testicular masses, edema. 1-2mm papule on left dorsal penis unchanged from last exam. Glans appears normal. No scrotal skin lesions. No tenderness or induration of the epididymis bilaterally.   Skin:  Warm to palpation, without rashes      Assessment/Plan: ICD-10-CM ICD-9-CM    1. Abnormal penile discharge R36.9 788.7 REFERRAL TO UROLOGY      CHLAMYDIA/GC PCR      HEPATITIS C AB      HIV 1/2 AG/AB, 4TH GENERATION,W RFLX CONFIRM      RPR      CULTURE, URINE       1) Abnl penile discharge and penile lesions - small papular lesions appear benign to me. Neg STI testing 2 mo ago, but pt very worried about possibility of infection. - Referral to urology to give opinion on chronic, daily penile discharge  - STI testing today, will send letter if results normal  - Nurse to pls reinforce offer of counseling appt to pt to help work through his anxiety about above issues      Greater than 50% of this 25 minute appt was spent in counseling patient about: anxiety, STI transmission, safer sex practices and reassurance of normal exam findings    DYLAN Williamson MD  [address] [place] [address]

## 2019-04-25 NOTE — PROGRESS NOTES
The following was performed at discharge station per provider with the assistance of Eduardo:   AVS printed, reviewed with pt and given to pt. RN briefly explained the Access Now program to pt and referred him to the registrar to make an appt with the . RN asked if pt would like an appt with the counselor regarding his anxiety related to symptoms, and he agreed to this. Referred to registrar for this appt as well. RN explained that if his labs are normal, he will receive a letter with the results. He will be called if abnormal.  Pt expressed understanding of the above information. Tyree Middleton.

## 2019-04-27 LAB
BACTERIA SPEC CULT: NORMAL
CC UR VC: NORMAL
SERVICE CMNT-IMP: NORMAL

## 2019-04-28 NOTE — PROGRESS NOTES
UCx negative, no evidence of urinary tract infection  All STI testing negative, no evidence of infection:  RPR (syphilis) neg  Hep C ab neg  HIV neg  GC/Chlam neg  Will send letter per pt request with these results

## 2019-05-28 ENCOUNTER — OFFICE VISIT (OUTPATIENT)
Dept: FAMILY MEDICINE CLINIC | Age: 19
End: 2019-05-28

## 2019-05-28 DIAGNOSIS — F41.1 GAD (GENERALIZED ANXIETY DISORDER): Primary | ICD-10-CM

## 2019-05-28 NOTE — PROGRESS NOTES
INITIAL SESSION    Presenting with numerous physical complaints. Labs from April of this year show negative for any STI or urinary infection. He had an encounter outside his home with what may have been a prostitute 1.5 years ago and since then has been imagining that he has an STI. Has girlfriend in [place], but has been afraid to have sex with her for fear of 'having her catch something from me.' Having difficult time letting go of this belief. He has been in [address] [place] [address] for 3 years; from [place]. Lives with his father. Has not worked for last 8 months due to back pain, but is planning on returning to work soon. Is not doing anything during the day; has few social outlets. The more we talked, the more he came out with worries about miniscule aches and pains, all being able to be related to secondary stress symptoms, ie, acid reflux, back pain, sweaty palms. He is very focused on illness. I provided education on mind-body connection, anxiety, how anxiety might show in the body. Worked on identifying some changes he can make so that his life is a little more full and so that he is not at home thinking and worrying about aches and pains. I wrote down some things for him to try (exercise, yoga, English classes, more time with friends, chamomile tea). He is to try these. He was unsure about a follow up; he has an appointment for two months.

## 2019-05-30 ENCOUNTER — TELEPHONE (OUTPATIENT)
Dept: FAMILY MEDICINE CLINIC | Age: 19
End: 2019-05-30

## 2019-05-30 NOTE — TELEPHONE ENCOUNTER
A representative from [facility] left a VM  Wanted to know some information from this patient   They just left a FAX# [phone]    Thank you

## 2019-05-31 NOTE — TELEPHONE ENCOUNTER
Patient was referred to Urology through [facility] should not need to call the [facility] office for patient information. I will follow-up on this.  Nikki Silva RN

## 2019-06-03 NOTE — TELEPHONE ENCOUNTER
Return call made to [facility]. I spoke with Anna Diaz and she then transferred me to Medical Records. Neither person knows why someone from their office called the [facility]. I explained to them that since the referral was submitted by us to [facility], they should first call [facility] if they need additional information about the patient. The medical records department stated they would \"look into\" who contacted the [facility] office and try to determine what information is needed.  Sadia Bethea RN

## 2019-06-14 ENCOUNTER — OFFICE VISIT (OUTPATIENT)
Dept: FAMILY MEDICINE CLINIC | Age: 19
End: 2019-06-14

## 2019-06-14 DIAGNOSIS — Z71.89 COUNSELING AND COORDINATION OF CARE: Primary | ICD-10-CM

## 2019-07-30 ENCOUNTER — DOCUMENTATION ONLY (OUTPATIENT)
Dept: FAMILY MEDICINE CLINIC | Age: 19
End: 2019-07-30

## 2019-11-21 ENCOUNTER — OFFICE VISIT (OUTPATIENT)
Dept: FAMILY MEDICINE CLINIC | Age: 19
End: 2019-11-21

## 2019-11-21 ENCOUNTER — HOSPITAL ENCOUNTER (OUTPATIENT)
Dept: LAB | Age: 19
Discharge: HOME | End: 2019-11-21

## 2019-11-21 VITALS
TEMPERATURE: 99.3 F | WEIGHT: 113.2 LBS | OXYGEN SATURATION: 99 % | HEART RATE: 71 BPM | HEIGHT: 64 IN | SYSTOLIC BLOOD PRESSURE: 127 MMHG | DIASTOLIC BLOOD PRESSURE: 78 MMHG | BODY MASS INDEX: 19.33 KG/M2

## 2019-11-21 DIAGNOSIS — Z23 ENCOUNTER FOR IMMUNIZATION: ICD-10-CM

## 2019-11-21 DIAGNOSIS — Z87.442 HISTORY OF KIDNEY STONES: ICD-10-CM

## 2019-11-21 DIAGNOSIS — M54.50 LOW BACK PAIN WITHOUT SCIATICA, UNSPECIFIED BACK PAIN LATERALITY, UNSPECIFIED CHRONICITY: Primary | ICD-10-CM

## 2019-11-21 DIAGNOSIS — Z11.4 SCREENING FOR HIV (HUMAN IMMUNODEFICIENCY VIRUS): ICD-10-CM

## 2019-11-21 LAB
HCV AB SERPL QL IA: NONREACTIVE
HCV COMMENT,HCGAC: NORMAL
HIV 1+2 AB+HIV1 P24 AG SERPL QL IA: NONREACTIVE
HIV12 RESULT COMMENT, HHIVC: NORMAL

## 2019-11-21 PROCEDURE — 86592 SYPHILIS TEST NON-TREP QUAL: CPT

## 2019-11-21 PROCEDURE — 86803 HEPATITIS C AB TEST: CPT

## 2019-11-21 PROCEDURE — 87389 HIV-1 AG W/HIV-1&-2 AB AG IA: CPT

## 2019-11-21 RX ORDER — NAPROXEN 500 MG/1
500 TABLET ORAL 2 TIMES DAILY WITH MEALS
Qty: 60 TAB | Refills: 1 | Status: SHIPPED | OUTPATIENT
Start: 2019-11-21

## 2019-11-21 NOTE — PROGRESS NOTES
AVS printed and reviewed. Instructed to schedule nurse appt for lab results. Encouraged to get flu vaccine today. Good Rx printed for E script sent to [facility] today. E script discussed. Discussion assisted by [facility] , Aspen Norris.

## 2019-11-21 NOTE — PROGRESS NOTES
Coordination of Care  1. Have you been to the ER, urgent care clinic since your last visit? Hospitalized since your last visit? No    2. Have you seen or consulted any other health care providers outside of the [address] [place] [address] since your last visit? Include any pap smears or colon screening. No    Does the patient need refills? NO    Learning Assessment Complete?  yes

## 2019-11-21 NOTE — PROGRESS NOTES
Assessment/Plan:    Diagnoses and all orders for this visit:    1. Low back pain without sciatica, unspecified back pain laterality, unspecified chronicity  -     naproxen (NAPROSYN) 500 mg tablet; Take 1 Tab by mouth two (2) times daily (with meals). 2. Screening for HIV (human immunodeficiency virus)  -     HIV 1/2 AG/AB, 4TH GENERATION,W RFLX CONFIRM; Future  -     RPR; Future  -     HEPATITIS C AB; Future    3. History of kidney stones    4. Encounter for immunization  -     INFLUENZA VIRUS VAC QUAD,SPLIT,PRESV FREE SYRINGE IM        Follow-up and Dispositions    · Return in about 2 weeks (around 12/5/2019) for nurse visit results . JORDON Oates expressed understanding of this plan. An AVS was printed and given to the patient.      ----------------------------------------------------------------------    Chief Complaint   Patient presents with    Annual Wellness Visit    Immunization/Injection       History of Present Illness:    23 yo new pt to me here for \"annual physical\". We began by discussing what the recs are for 23 yo male physicals and in the absence of any real family hx of current sxs how there are no specific labs to be done. He would like to have labs for HIV done. I told him that this is perfectly reasonable and as such, his 24 yo wife who accompanies him today and is also being seen, can have the same labs drawn. He is a  and has to repeatedly lift 79 or more pounds. He has bilateral para spinal lumbar pain. No radiation of the pain. I asked him to demonstrate how he lifts. He bends at his waist and lifts with his back. I demonstrated a safer lifting technique  He went to the ER in the past and was told he has \"stones in his kidneys\". He has no dysuria or hematuria. We discussed that he needs to stay hydrated    No past medical history on file.     Current Outpatient Medications   Medication Sig Dispense Refill    naproxen (NAPROSYN) 500 mg tablet Take 1 Tab by mouth two (2) times daily (with meals). 60 Tab 1    clotrimazole (LOTRIMIN) 1 % topical cream Apply  to affected area two (2) times a day. Skin lesions on face, chest. [place] dos daltones al reilly a manjennifers janett en monica, pecho 30 g 1    ketoconazole (NIZORAL) 2 % shampoo Apply 5 to 10 mL to wet scalp, lather, leave on 3 to 5 minutes, and rinse; apply twice weekly for 2 to 4 weeks. 120 mL 0       No Known Allergies    Social History     Tobacco Use    Smoking status: Never Smoker    Smokeless tobacco: Never Used   Substance Use Topics    Alcohol use: No     Alcohol/week: 0.0 standard drinks    Drug use: No       No family history on file.     Physical Exam:     Visit Vitals  /78 (BP 1 Location: Right arm, BP Patient Position: Sitting)   Pulse 71   Temp 99.3 °F (37.4 °C) (Temporal)   Ht 5' 3.58\" (1.615 m)   Wt 113 lb 3.2 oz (51.3 kg)   SpO2 99%   BMI 19.69 kg/m²   pleasant, looks well     A&Ox3  WDWN NAD  Respirations normal and non labored  MSK exam- tender with toe touch para spinal m lumbar region  Gait, neuro intact   Proper lifting techniques discussed

## 2019-11-21 NOTE — PROGRESS NOTES
[facility]  Requests flu vaccine. Denies fever and egg allergy. VIS information sheet given to patient. Explained possible s/e. Reviewed s/sx indicating need to be seen in ER. Patient had no adverse reaction at time of discharge. Entered into VIIS. GIVEN BY PEDRO WARNER LPN.  Laury Frances RN

## 2019-11-21 NOTE — PATIENT INSTRUCTIONS
Aprenda sobre cómo tener elia espalda saludable - [ Learning About How to Have a Healthy Back ]  ¿Qué causa el dolor de espalda? El dolor de espalda a menudo es causado por uso excesivo, distensión o lesión. Por ejemplo, las personas frecuentemente se lastiman la espalda al practicar deportes o trabajar en el toi, al ser sacudidas en un accidente automovilístico o al levantar algo demasiado pesado. El envejecimiento también desempeña un papel. Tretha Moulding y los músculos tienden a perder fuerza a medida que envejece, lo cual aumenta las probabilidades de Briana Hall. Los discos [employer] huesos de la columna vertebral (vértebras) podrían tener desgaste y ya no proporcionar suficiente amortiguamiento entre los [place]. Un disco que sobresale o que se rompe (hernia de disco) puede presionar sobre los nervios, causando dolor de [place]. En [employer], el dolor de espalda es el resultado de la artritis, de vértebras rotas debido a la pérdida de [employer] (osteoporosis), de elia enfermedad o de un problema de la columna vertebral.  Aunque la mayoría de las personas tienen dolor de espalda en un momento u otro, hay medidas que se pueden crissy para reducir la probabilidad de sufrirlo. ¿Cómo puede tener elia espalda saludable? Reduzca la tensión en la espalda mediante elia buena postura  El desplomarse o encorvarse por sí solo annie vez no cause dolor en la parte baja de la espalda. Gabby elia vez que la espalda se ha distendido o lesionado, la tracy postura puede empeorar el dolor. · [place] en elia posición que mantenga las curvas naturales de la espalda y en un colchón cómodo. [place] de lado con elia almohada entre las rodillas o boca arriba con elia almohada debajo de las rodillas. Estas posiciones pueden reducir la tensión en la espalda. · Manténgase erguido cuando esté de pie o sentado.  Tener elia \"buena postura\" por lo general significa que las [place], los hombros y las caderas están en Terrye Downy recta.  · Si debe permanecer de pie mucho tiempo, ponga un pie sobre un taburete, un bordillo o Brynn Cool. Cambie de pie cada cierto tiempo. · Siéntese en elia silla que sea lo suficientemente baja lilly para poner ambos pies en el suelo con las rodillas más o menos al nivel de la cadera. Si aguilar silla o aguilar escritorio son Lenora Joaquin, utilice un reposapiés con el fin de elevar las rodillas. Colóquese elia almohada pequeña, elia toalla enrollada o un rollo lumbar en la curva de la espalda si necesita más apoyo. · Pruebe usar elia silla ergonómica con apoyo para las rodillas (\"kneeling chair\"), pues ayuda a inclinar las caderas hacia sandie. [place] le reduce la presión en la parte baja de la espalda. · Intente sentarse sobre elia pelota de ejercicio. Puede balancearse de lado a lado, lo que ayuda a mantener la espalda relajada. · Al conducir, [place]'s las rodillas mary ann al nivel de las caderas. Siéntese derecho y conduzca con ambas angle sobre el volante. Los brazos deben estar levemente flexionados. Reduzca la tensión en la espalda levantando objetos con cuidado  · Agáchese doblando solo la cadera y las rodillas. Si es necesario, ponga elia rodilla en el suelo y extienda la otra rodilla frente a usted en ángulo recto (genuflexión). · Empuje el pecho hacia adelante. [place] le ayuda a mantener la parte superior de la espalda derecha mientras mantiene un arco ligero en la parte baja. · Sostenga la carga tan cerca del cuerpo lilly sea posible, a la altura del ombligo. · Utilice los pies para cambiar de dirección con pasos cortos. · Dirija con las caderas al cambiar de dirección. Mantenga los hombros en línea con las caderas [place] se desplaza. · Asiente la carga con cuidado, acuclillándose únicamente con las rodillas y las caderas. Ejercite y estire la espalda  · [place] algo de ejercicio la mayoría de los días de la [place], si aguilar médico lo aprueba. Puede caminar, correr, nadar o montar en bicicleta.   · [employer] músculos de la espalda. Los siguientes son algunos ejercicios que puede probar:  ? Acuéstese de espalda y lleve suavemente elia rodilla flexionada hacia Andrade Ranks. Vuelva a poner alyssia pie en el suelo y luego donn lo mismo con la otra rodilla. ? Donn inclinaciones de pelvis. Acuéstese de espalda con las rodillas flexionadas. Contraiga los músculos abdominales. Hunda el ombligo hacia adentro y [place], en dirección a las costillas. Deberá sentir lilly si la espalda estuviera ejerciendo presión sobre el suelo y que las caderas y la pelvis se despegan levemente del suelo. Mantenga la posición lorena 6 segundos mientras respira de [place] pausada. ? Siéntese con la espalda contra la pared. · Mantenga yuniel los músculos del tronco. Los músculos de la espalda, el abdomen y los glúteos sostienen la columna vertebral.  ? Hunda el abdomen e imagine que está llevando el ombligo hacia la columna. Mantenga la posición lorena 6 segundos y luego relájese. Recuerde seguir respirando de manera normal [employer]. ? Chandrika Fan. Hágalos siempre con las rodillas dobladas. Mantenga la parte baja de la espalda sobre el suelo y [employer] hombros hacia las rodillas con un movimiento lento y uniforme. Mantenga los brazos cruzados sobre el pecho. Si esto le causa molestias en el sid, pruebe a poner las angle detrás del sid (no de la april), con los codos abiertos. ? Acuéstese boca arriba con las rodillas flexionadas y los pies apoyados sobre el suelo. Contraiga los músculos abdominales y luego empuje con los pies y eleve las nalgas algunas pulgadas. Mantenga la posición lorena 6 segundos mientras continúa respirando de [place] normal y luego vuelva a bajar lentamente hacia el suelo. Repita de 8 a 12 veces. ? Si le gusta el ejercicio en hernan, pruebe con Pilates o yoga.  Estas clases tienen posiciones que fortalecen los músculos del tronco.  Lleve un estilo de leda saludable  · Mantenga un peso saludable para evitar sobrecargar la espalda. · No fume. Fumar aumenta el riesgo de osteoporosis, que debilita la columna vertebral. Si necesita ayuda para dejar de fumar, hable con aguilar médico sobre programas y medicamentos para dejar de fumar. Estos pueden aumentar jose luis probabilidades de dejar el hábito para siempre. ¿Dónde puede encontrar más información en inglés? Mirna Maddox a http://brandie-madiha.info/. Escriba L315 en la búsqueda para aprender más acerca de \"Aprenda sobre cómo tener elia espalda saludable - [ Learning About How to Have a Healthy Back ]. \"  Revisado: 26 junio, 2019  Versión del contenido: 12.2  © 7478-9037 Healthwise, Incorporated. Las instrucciones de cuidado fueron adaptadas bajo licencia por [employer] (which disclaims liability or warranty for this information). Si usted tiene [place] afección médica o sobre estas instrucciones, siempre pregunte a aguilar profesional de cielo. Healthwise, Incorporated niega toda garantía o responsabilidad por aguilar uso de esta información. Aprenda sobre el alivio del dolor de espalda - [ Learning About Relief for Back Pain ]  ¿Qué son la tensión y la distensión en la espalda? La distensión en la espalda ocurre cuando usted estira [place], o fuerza, un músculo de la espalda. Usted puede lesionarse la espalda en un accidente o cuando hace ejercicio o levanta algo. La mayoría de los conrad de espalda mejoran con reposo y con el tiempo. Usted puede cuidarse en el hogar para ayudar a que aguilar espalda sane. ¿Qué es lo aguila que puede hacer para aliviar el dolor de espalda? Cuando empiece a sentir dolor de espalda, siga estos pasos:  · Camine. Dé un paseo corto (10 a 20 minutos) sobre elia superficie plana (sin pendientes, donde no haya colinas o escaleras) cada 2 o 3 horas. Solo camine distancias que pueda manejar sin dolor, especialmente dolor en las piernas. · Relájese. Encuentre elia posición cómoda para descansar.  Arron Bench se sienten cómodas en el suelo o en elia cama de firmeza media con elia almohada pequeña debajo de la april y otra debajo de las rodillas. Otras prefieren acostarse de lado con elia almohada entre las rodillas. No permanezca en elia posición por [employer]. · Pruebe con calor o hielo. Trate de [employer] almohadilla térmica a baja o media temperatura, o tome elia ducha tibia de 15 o 20 minutos cada 2 o 3 horas. O puede comprar vendas térmicas desechables que se usan elia nathaly vez por hasta 8 horas. También puede probar compresas de hielo entre 10 y 15 minutos cada 2 o 3 horas. Puede usar elia compresa de hielo o elia bolsa de verduras congeladas envuelta en elia toalla delgada. No existe evidencia sólida de que el calor o el hielo ayuden, nicki puede probarlos para marguerite si son de [facility]. También podría convenirle probar alternar [employer] frío y el calor. · [employer] analgésicos (medicamentos para el dolor) exactamente según las indicaciones. ? Si el médico le recetó un analgésico, tómelo según las indicaciones. ? Si no está tomando un analgésico recetado, pregúntele a aguilar médico si puede crissy ashley de [employer]. ¿Qué más puede hacer? · [place] estiramientos y ejercicio. Los ejercicios que incrementan la flexibilidad pueden aliviar el dolor y facilitar que los músculos mantengan a la columna vertebral en elia buena posición neutra. Y no se olvide de seguir caminando. · Hágase masajes usted mismo. Usted puede darse masaje para relajarse después del trabajo o de la escuela o para sentirse lleno de energía en la mañana. No es difícil [employer], las angle o el sid. El darse masaje usted mismo funciona mejor si está con ropa cómoda y sentado o [place] en elia posición cómoda. Utilice aceite o loción para masajearse la piel directamente. · Reduzca el estrés. El dolor de espalda puede llevar a un círculo kena: La angustia por el dolor tensa los músculos en la espalda, lo que a aguilar vez causa más dolor.  Anita Fontenot a relajar la mente y los músculos para disminuir el estrés. ¿Dónde puede encontrar más información en inglés? Yuni Wiley a http://brandie-madiha.info/. Riri Delgado H806 en la búsqueda para aprender más acerca de \"Aprenda sobre el [place] del dolor de espalda - [ Learning About Relief for Back Pain ]. \"  Revisado: 26 junio, 2019  Versión del contenido: 12.2  © 4471-5710 Healthwise, Incorporated. Las instrucciones de cuidado fueron adaptadas bajo licencia por [employer] (which disclaims liability or warranty for this information). Si usted tiene [place] afección médica o sobre estas instrucciones, siempre pregunte a aguilar profesional de cielo. Healthwise, Incorporated niega toda garantía o responsabilidad por aguilar uso de esta información.

## 2019-11-22 LAB — RPR SER QL: NONREACTIVE

## 2019-12-05 ENCOUNTER — CLINICAL SUPPORT (OUTPATIENT)
Dept: FAMILY MEDICINE CLINIC | Age: 19
End: 2019-12-05

## 2019-12-05 DIAGNOSIS — Z71.9 COUNSELED BY NURSE: Primary | ICD-10-CM

## 2019-12-05 NOTE — PROGRESS NOTES
Pt in clinic today for lab results from 11/21/19. Results discussed with per per MM recommendation. Pt has also been ad advised that he will only need to be tested once a year for hep c and HIV. Pt verbalized understanding and had no further questions.

## 2020-01-16 NOTE — PROGRESS NOTES
Patient received an application from [address] [place] [address]. He does not have a have a social security number so he does not qualify for [employer]. He stated that he has not received any bills and has [employer].
Private car